# Patient Record
Sex: MALE | Race: WHITE
[De-identification: names, ages, dates, MRNs, and addresses within clinical notes are randomized per-mention and may not be internally consistent; named-entity substitution may affect disease eponyms.]

---

## 2018-07-04 ENCOUNTER — HOSPITAL ENCOUNTER (EMERGENCY)
Dept: HOSPITAL 60 - LB.ED | Age: 38
Discharge: HOME | End: 2018-07-04
Payer: COMMERCIAL

## 2018-07-04 DIAGNOSIS — J02.9: Primary | ICD-10-CM

## 2018-07-04 NOTE — EDM.PDOC
ED HPI GENERAL MEDICAL PROBLEM





- General


Chief Complaint: General


Stated Complaint: sorethroat


Time Seen by Provider: 07/04/18 07:35


Source of Information: Reports: Patient, RN


History Limitations: Reports: No Limitations





- History of Present Illness


INITIAL COMMENTS - FREE TEXT/NARRATIVE: 


38 yr male presents with sore throat, cough and not feeling well for a few days 

and worsening in last 24-48 hr.  States no green, productive phlegm with this.  

States no real temperature.  He does have seasonal allergies.  No headache, no 

itchy eyes, no itchy throat, no ear pressure.   





  ** Throat


Pain Score (Numeric/FACES): 7





Social & Family History





- Tobacco Use


Smoking Status *Q: Never Smoker


Second Hand Smoke Exposure: No





ED ROS GENERAL





- Review of Systems


Review Of Systems: See Below


Constitutional: Reports: No Symptoms


HEENT: Reports: Throat Pain, Other (States pain is deep and feels low like to 

[place] area.).  Denies: Throat Swelling


Respiratory: Reports: Cough.  Denies: Shortness of Breath, Wheezing


Cardiovascular: Reports: No Symptoms


Musculoskeletal: Reports: No Symptoms


Skin: Reports: No Symptoms


Neurological: Reports: Headache (States he is just starting to get a headache 

and he does get these frequently.), Other (States no history of migraines, and 

no tingling or numbness noted)


Psychiatric: Reports: No Symptoms


Hematologic/Lymphatic: Reports: No Symptoms





ED EXAM, GENERAL





- Physical Exam


Exam: See Below


Exam Limited By: No Limitations


General Appearance: Alert, No Apparent Distress


Ears: Normal Canal, Hearing Grossly Normal, Normal TMs


Nose: Normal Inspection, Normal Mucosa


Throat/Mouth: Normal Inspection, Normal Lips, Normal Voice, No Airway Compromise


Head: Atraumatic, Normocephalic


Neck: Normal Inspection, Supple, Non-Tender


Respiratory/Chest: No Respiratory Distress, Lungs Clear, Normal Breath Sounds


Cardiovascular: Regular Rate, Rhythm, No Edema


Neurological: Alert, Oriented, Normal Cognition


Psychiatric: Normal Affect, Normal Mood


Skin Exam: Warm, Dry, Normal Color


Lymphatic: No Adenopathy





Course





- Vital Signs


Last Recorded V/S: 


 Last Vital Signs











Temp  98.6 F   07/04/18 07:26


 


Pulse  64   07/04/18 07:26


 


Resp  16   07/04/18 07:26


 


BP  121/78   07/04/18 07:26


 


Pulse Ox  99   07/04/18 07:26














- Orders/Labs/Meds


Labs: 


 Laboratory Tests











  07/04/18 Range/Units





  08:00 


 


WBC  10.8  (4.0-11.0)  K/uL


 


RBC  4.63  (4.50-6.50)  M/uL


 


Hgb  14.2  (13.0-18.0)  g/dL


 


Hct  39.9 L  (40.0-54.0)  %


 


MCV  86  (76-96)  fL


 


MCH  30.7  (27.0-32.0)  pg


 


MCHC  35.6 H  (31.0-35.0)  g/dL


 


RDW  13.2  (11.0-16.0)  %


 


Plt Count  193  (150-400)  K/uL


 


MPV  11.1 H  (6.0-10.0)  fL


 


Neut % (Auto)  69.7  (45.0-70.0)  %


 


Lymph % (Auto)  16.1 L  (20.0-40.0)  %


 


Mono % (Auto)  13.4 H  (3.0-10.0)  %


 


Eos % (Auto)  0.7 L  (1.0-5.0)  %


 


Baso % (Auto)  0.1  (0.0-0.5)  %


 


Neut # (Auto)  7.50  (2.00-7.50)  K/uL


 


Lymph # (Auto)  1.73  (1.50-4.00)  K/uL


 


Mono # (Auto)  1.44 H  (0.20-0.80)  K/uL


 


Eos # (Auto)  0.08  (0.04-0.40)  K/uL


 


Baso # (Auto)  0.01 L  (0.02-0.10)  K/uL














- Re-Assessments/Exams


Free Text/Narrative Re-Assessment/Exam: 





07/04/18 08:21  Strep screen and CBC w diff completed and reviewed with pt.  

Strep screen is negative.  May use warm, salt water gargles as needed.  Slight 

elevated WBC.  Will start Amoxicillin 1 tablet tid X 10 days for pharyngitis.  

Pt ambulatory and states understanding.  States no allergies to Amoxicillin.  

Pt was able to take 1 tablet in ER and swallow this.  States he is in the area 

until Friday and then back to his home.  Recommend RTC or PCP if symptoms 

worsen in next 3-4 days.  Symptoms should improve in couple days of starting 

antibiotic.  Watch for any signs of allergic reaction to medication.  Pt 

discharged to self care and ambulatory in no acute distress.








Departure





- Departure


Time of Disposition: 08:25


Disposition: Home, Self-Care 01


Condition: Good


Clinical Impression: 


 Pharyngitis








- Discharge Information


Instructions:  Pharyngitis


Referrals: 


PCP,None [Primary Care Provider] - 


Forms:  ED Department Discharge


Care Plan Goals: 


Take medication as prescribed. Return to clinic or hospital if symptoms worsen 

or return. May use warm salt water gargles as needed.